# Patient Record
Sex: MALE | Race: OTHER | NOT HISPANIC OR LATINO | URBAN - METROPOLITAN AREA
[De-identification: names, ages, dates, MRNs, and addresses within clinical notes are randomized per-mention and may not be internally consistent; named-entity substitution may affect disease eponyms.]

---

## 2020-07-29 ENCOUNTER — EMERGENCY (EMERGENCY)
Facility: HOSPITAL | Age: 60
LOS: 1 days | Discharge: HOME | End: 2020-07-29
Attending: EMERGENCY MEDICINE | Admitting: EMERGENCY MEDICINE
Payer: MEDICAID

## 2020-07-29 VITALS
OXYGEN SATURATION: 100 % | RESPIRATION RATE: 15 BRPM | DIASTOLIC BLOOD PRESSURE: 96 MMHG | HEART RATE: 98 BPM | SYSTOLIC BLOOD PRESSURE: 154 MMHG

## 2020-07-29 VITALS
TEMPERATURE: 98 F | RESPIRATION RATE: 15 BRPM | HEART RATE: 78 BPM | OXYGEN SATURATION: 98 % | DIASTOLIC BLOOD PRESSURE: 69 MMHG | SYSTOLIC BLOOD PRESSURE: 134 MMHG

## 2020-07-29 PROCEDURE — 99284 EMERGENCY DEPT VISIT MOD MDM: CPT

## 2020-07-29 PROCEDURE — 73562 X-RAY EXAM OF KNEE 3: CPT | Mod: 26,LT

## 2020-07-29 RX ORDER — KETOROLAC TROMETHAMINE 30 MG/ML
30 SYRINGE (ML) INJECTION ONCE
Refills: 0 | Status: DISCONTINUED | OUTPATIENT
Start: 2020-07-29 | End: 2020-07-29

## 2020-07-29 RX ADMIN — Medication 30 MILLIGRAM(S): at 02:51

## 2020-07-29 NOTE — ED PROVIDER NOTE - OBJECTIVE STATEMENT
60 yo with no sig PMH presenting with 2 days of progressive R knee pain and swelling.  Feels like a sharp pain and tightness when he moves and walks.  No known trauma. No fevers and pain does not radiate.  Not on any AC.  Took tylenol for pain yesterday.  When did not approve came to the ED. Protocol For Photochemotherapy: Petrolatum And Broad Band Uvb: The patient received Photochemotherapy: Petrolatum and Broad Band UVB.

## 2020-07-29 NOTE — ED ADULT NURSE NOTE - OBJECTIVE STATEMENT
alert oriented no distress c/o pain to left knee   left knee is warm red swollen and painful  also c/o pain to left lower back and left neck pain x 2 years

## 2020-07-29 NOTE — ED PROVIDER NOTE - PATIENT PORTAL LINK FT
You can access the FollowMyHealth Patient Portal offered by [facility] by registering at the following website: [email or web address]. By joining [employer]’s FollowMyHealth portal, you will also be able to view your health information using other applications (apps) compatible with our system.

## 2020-07-29 NOTE — ED ADULT TRIAGE NOTE - CHIEF COMPLAINT QUOTE
pt arrives w/ c/o left knee pain x 2 days. pt denies any injury to knee to knee. pt arrives with knee swollen and red. pt also c/o back pain. pt denies any falls. pt arrives w/ c/o left knee pain x 2 days. pt denies any injury to knee. pt arrives with knee swollen and red. pt also c/o back pain. pt denies any falls.

## 2020-07-29 NOTE — ED ADULT NURSE NOTE - CHIEF COMPLAINT QUOTE
pt arrives w/ c/o left knee pain x 2 days. pt denies any injury to knee. pt arrives with knee swollen and red. pt also c/o back pain. pt denies any falls.

## 2020-07-29 NOTE — ED PROVIDER NOTE - CLINICAL SUMMARY MEDICAL DECISION MAKING FREE TEXT BOX
pt with knee swelling and pain.  Not consistent with a septic joint as is good ROM.  Could be related to a gouty arthritis more so than hemarthrosis.  Bedside sono with much fluid to be drained.  Will treat with NSAIDs pt with knee swelling and pain.  Not consistent with a septic joint as is good ROM.  Could be related to a gouty arthritis more so than hemarthrosis.  Bedside sono with minimal fluid to be drained.  Will treat with NSAIDs

## 2020-07-29 NOTE — ED PROVIDER NOTE - NSFOLLOWUPCLINICS_GEN_ALL_ED_FT
[facility]  Orthopedic Surgery  [address], [address]rd & [address]th [place] [address]  Phone: [phone]  Fax:   Follow Up Time:

## 2020-07-29 NOTE — ED PROVIDER NOTE - PHYSICAL EXAMINATION
Gen: Well appearing in NAD  Head: NC/AT  Neck: trachea midline  Resp:  No distress  Ext: no deformities - The L knee is swollen and erythema with some warmth.  There is almost full active and passive range of motion with reported pain.  Good distal PMS.  Neuro:  A&O appears non focal  Skin:  Warm and dry as visualized  Psych:  Normal affect and mood

## 2020-07-29 NOTE — ED PROVIDER NOTE - NSFOLLOWUPINSTRUCTIONS_ED_ALL_ED_FT
No signs of emergency medical condition on today's workup.  Presumptive diagnosis made, but further evaluation may be required by your primary care doctor or specialist for a definitive diagnosis.    Please follow up with your primary care physician in 24-48 hours  A copy of your results have been provided to you  A referral to Orthopedic surgery have been provided to you  You can take Motrin 800mg 3 times a day for 5 days.  Please come back if any of the following: Fever, worsening redness, pain, numbness, tingling, inability to move knee, worsening swelling or any major concern

## 2021-10-30 ENCOUNTER — EMERGENCY (EMERGENCY)
Facility: HOSPITAL | Age: 61
LOS: 1 days | Discharge: HOME | End: 2021-10-30
Attending: EMERGENCY MEDICINE | Admitting: EMERGENCY MEDICINE
Payer: MEDICAID

## 2021-10-30 VITALS
OXYGEN SATURATION: 100 % | DIASTOLIC BLOOD PRESSURE: 84 MMHG | TEMPERATURE: 98 F | HEART RATE: 81 BPM | RESPIRATION RATE: 18 BRPM | SYSTOLIC BLOOD PRESSURE: 140 MMHG

## 2021-10-30 PROBLEM — Z78.9 OTHER SPECIFIED HEALTH STATUS: Chronic | Status: ACTIVE | Noted: 2020-07-29

## 2021-10-30 PROCEDURE — 73030 X-RAY EXAM OF SHOULDER: CPT | Mod: 26,LT

## 2021-10-30 PROCEDURE — 99284 EMERGENCY DEPT VISIT MOD MDM: CPT | Mod: 25

## 2021-10-30 PROCEDURE — 93010 ELECTROCARDIOGRAM REPORT: CPT

## 2021-10-30 RX ORDER — IBUPROFEN 200 MG
600 TABLET ORAL ONCE
Refills: 0 | Status: COMPLETED | OUTPATIENT
Start: 2021-10-30 | End: 2021-10-30

## 2021-10-30 RX ADMIN — Medication 600 MILLIGRAM(S): at 12:00

## 2021-10-30 NOTE — ED PROVIDER NOTE - ATTENDING CONTRIBUTION TO CARE
60M L shoulder pain x 1 month ago, atraumatic.  no weakness or tingling.  Rad to L proximal humerus area.  Plan check xray, rx pain med.  PMHX possible gout.  If xray benign will have pt f/u with ortho.   EKG SR at 76 no harley no std no twi.   qtc 424.  Likely arthritis or tendinitis or other non-fracture MSK problem as shoulder xray benign.  VS:  unremarkable    GEN - NAD;   well appearing;   A+O x3   HEAD - NC/AT     ENT - PEERL, EOMI, mucous membranes    moist , no discharge      NECK: Neck supple, non-tender without lymphadenopathy, no masses, no JVD  PULM - CTA b/l,  symmetric breath sounds  COR -  normal heart sounds    ABD - , ND, NT, soft,  BACK - no CVA tenderness, nontender spine     EXTREMS - no edema, no deformity, warm and well perfused  (+)mild ttp to L shoulder, mild decreased ROM L shoulder.  Distal NVT intact.  Skin overlying shoulder intact.  No deformity of shoulder.  Shoulder rotator cuff testing - no deficits.    SKIN - no rash    or bruising      NEUROLOGIC - alert, face symmetric, speech fluent, sensation nl, motor no focal deficit.

## 2021-10-30 NOTE — ED ADULT TRIAGE NOTE - CHIEF COMPLAINT QUOTE
Pt c/o lt shoulder and arm pain x1 month Pt c/o lt shoulder and arm pain x1 month--worse with movement

## 2021-10-30 NOTE — ED PROVIDER NOTE - NSFOLLOWUPINSTRUCTIONS_ED_ALL_ED_FT
The following are a list of orthopedic clinics in the surrounding area    [facility]  Orthopedic Surgery  [address] [place] [id number]  Phone: [phone]  Fax:   Follow Up Time:     [facility]  Orthopedics  [address] [place] [address]  Phone: [phone]  Fax: [phone]  Follow Up Time:     [facility]  Orthopedics  [address] [place] [address]

## 2021-10-30 NOTE — ED ADULT NURSE NOTE - OBJECTIVE STATEMENT
Receive pt. in Intake room 8 alert and oriented x 4, presenting to the ER with complaints of left shoulder pain. Pt. stated " I have pain in my left shoulder for 1 month I took tylenol but it is not helping". Medicated as ordered, left shoulder mobile, will continue to monitor.

## 2021-10-30 NOTE — ED PROVIDER NOTE - PATIENT PORTAL LINK FT
You can access the FollowMyHealth Patient Portal offered by [facility] by registering at the following website: [email or web address]. By joining [facility]’s FollowMyHealth portal, you will also be able to view your health information using other applications (apps) compatible with our system.

## 2021-10-30 NOTE — ED PROVIDER NOTE - PHYSICAL EXAMINATION
VS:  unremarkable    GEN - NAD;   well appearing;   A+O x3   HEAD - NC/AT     ENT - PEERL, EOMI, mucous membranes    moist , no discharge      NECK: Neck supple, non-tender without lymphadenopathy, no masses, no JVD  PULM - CTA b/l,  symmetric breath sounds  COR -  normal heart sounds    ABD - , ND, NT, soft,  BACK - no CVA tenderness, nontender spine     EXTREMS - no edema, no deformity, warm and well perfused  (+)mild ttp to L shoulder, mild decreased ROM L shoulder.  Distal NVT intact.  Skin overlying shoulder intact.  No deformity of shoulder.  Shoulder rotator cuff testing - no deficits.    SKIN - no rash    or bruising      NEUROLOGIC - alert, face symmetric, speech fluent, sensation nl, motor no focal deficit.

## 2021-10-30 NOTE — ED PROVIDER NOTE - OBJECTIVE STATEMENT
This is a 60 yr old M, no pertinent pmh with c/o left shoulder pain radiation to his left arm for 1 month. Ivelisse  Jeb ID # [id number].   Pt states it is worsen with movements, cause difficulties to lift shoulder above ear. Denies any weakness, numbness, tingling, sensory deficits. Denies any trauma fall.